# Patient Record
Sex: MALE | Race: ASIAN
[De-identification: names, ages, dates, MRNs, and addresses within clinical notes are randomized per-mention and may not be internally consistent; named-entity substitution may affect disease eponyms.]

---

## 2017-08-08 ENCOUNTER — HOSPITAL ENCOUNTER (EMERGENCY)
Dept: HOSPITAL 19 - COL.ER | Age: 22
Discharge: HOME | End: 2017-08-08
Payer: COMMERCIAL

## 2017-08-08 VITALS — DIASTOLIC BLOOD PRESSURE: 86 MMHG | SYSTOLIC BLOOD PRESSURE: 144 MMHG | TEMPERATURE: 99.3 F

## 2017-08-08 VITALS — HEIGHT: 64.02 IN | BODY MASS INDEX: 34.66 KG/M2 | WEIGHT: 203.05 LBS

## 2017-08-08 VITALS — HEART RATE: 92 BPM

## 2017-08-08 DIAGNOSIS — R45.851: ICD-10-CM

## 2017-08-08 DIAGNOSIS — F32.9: Primary | ICD-10-CM

## 2017-08-08 LAB
ADJUSTED CALCIUM: 8.8 MG/DL (ref 8.4–10.2)
ALBUMIN SERPL-MCNC: 4.5 GM/DL (ref 3.5–5)
ALP SERPL-CCNC: 60 U/L (ref 50–136)
ALT SERPL-CCNC: 144 U/L (ref 21–72)
AMPHET UR-MCNC: NEGATIVE NG/ML
ANION GAP SERPL CALC-SCNC: 11 MMOL/L (ref 7–16)
APAP SERPL-MCNC: < 10 UG/ML (ref 10–30)
BARBITURATES UR SCN-MCNC: NEGATIVE NG/ML
BASOPHILS # BLD: 0 10*3/UL (ref 0–0.2)
BASOPHILS NFR BLD AUTO: 0.4 % (ref 0–2)
BENZODIAZ UR-MCNC: NEGATIVE NG/ML
BILIRUB SERPL-MCNC: 1.1 MG/DL (ref 0–1)
BUN SERPL-MCNC: 16 MG/DL (ref 9–20)
BUPRENORPHINE UR-MCNC: NEGATIVE NG/ML
CALCIUM SERPL-MCNC: 9.2 MG/DL (ref 8.4–10.2)
CHLORIDE SERPL-SCNC: 102 MMOL/L (ref 98–107)
CO2 SERPL-SCNC: 27 MMOL/L (ref 22–30)
CREAT SERPL-SCNC: 1.06 MG/DL (ref 0.66–1.25)
EOSINOPHIL # BLD: 0.1 10*3/UL (ref 0–0.7)
EOSINOPHIL NFR BLD: 1.1 % (ref 0–4)
ERYTHROCYTE [DISTWIDTH] IN BLOOD BY AUTOMATED COUNT: 13 % (ref 11.5–14.5)
GLUCOSE SERPL-MCNC: 104 MG/DL (ref 74–106)
GRANULOCYTES # BLD AUTO: 70.4 % (ref 42.2–75.2)
HCT VFR BLD AUTO: 49.7 % (ref 42–52)
HGB BLD-MCNC: 17.1 G/DL (ref 13.5–18)
LYMPHOCYTES # BLD: 1.7 10*3/UL (ref 1.2–3.4)
LYMPHOCYTES NFR BLD: 20.6 % (ref 20–51)
MCH RBC QN AUTO: 30 PG (ref 27–31)
MCHC RBC AUTO-ENTMCNC: 34 G/DL (ref 33–37)
MCV RBC AUTO: 88 FL (ref 80–100)
METHADONE UR-MCNC: NEGATIVE NG/ML
MONOCYTES # BLD: 0.6 10*3/UL (ref 0.1–0.6)
MONOCYTES NFR BLD AUTO: 7.1 % (ref 1.7–9.3)
NEUTROPHILS # BLD: 5.9 10*3/UL (ref 1.4–6.5)
OPIATES UR-MCNC: NEGATIVE NG/ML
OXYCODONE UR CFM-MCNC: NEGATIVE NG/ML
PCP UR-MCNC: NEGATIVE NG/ML
PLATELET # BLD AUTO: 315 K/MM3 (ref 130–400)
PMV BLD AUTO: 9.2 FL (ref 7.4–10.4)
POTASSIUM SERPL-SCNC: 4.3 MMOL/L (ref 3.4–5)
PROPOXYPH UR-MCNC: NEGATIVE NG/ML
PROT SERPL-MCNC: 8.1 GM/DL (ref 6.4–8.2)
RBC # BLD AUTO: 5.68 M/MM3 (ref 4.2–5.6)
SALICYLATES SERPL-MCNC: < 1 MG/DL
SODIUM SERPL-SCNC: 139 MMOL/L (ref 137–145)
THC CANNABINOIDS URINE: NEGATIVE NG/ML
WBC # BLD AUTO: 8.4 K/MM3 (ref 4.8–10.8)

## 2017-09-05 ENCOUNTER — HOSPITAL ENCOUNTER (OUTPATIENT)
Dept: HOSPITAL 19 - COL.CARD | Age: 22
End: 2017-09-05
Attending: FAMILY MEDICINE
Payer: COMMERCIAL

## 2017-09-05 DIAGNOSIS — R00.0: Primary | ICD-10-CM

## 2019-04-02 ENCOUNTER — HOSPITAL ENCOUNTER (OUTPATIENT)
Dept: HOSPITAL 19 - COL.RAD | Age: 24
End: 2019-04-02
Attending: FAMILY MEDICINE
Payer: COMMERCIAL

## 2019-04-02 DIAGNOSIS — R94.5: Primary | ICD-10-CM

## 2019-05-29 NOTE — NUR
LEFT INSTRUCTIONS ON THE MACHINE. ASKED FOR PT TO CALL BACK AND THE NUMBER WAS
LEFT. WE NEED POMH, MEDS AND ALLERGIES

## 2019-06-03 ENCOUNTER — HOSPITAL ENCOUNTER (OUTPATIENT)
Dept: HOSPITAL 19 - COL.RAD | Age: 24
End: 2019-06-03
Attending: PHYSICIAN ASSISTANT
Payer: COMMERCIAL

## 2019-06-03 VITALS — SYSTOLIC BLOOD PRESSURE: 135 MMHG | DIASTOLIC BLOOD PRESSURE: 99 MMHG | HEART RATE: 63 BPM

## 2019-06-03 VITALS — SYSTOLIC BLOOD PRESSURE: 146 MMHG | DIASTOLIC BLOOD PRESSURE: 90 MMHG | HEART RATE: 74 BPM

## 2019-06-03 VITALS — HEART RATE: 66 BPM | DIASTOLIC BLOOD PRESSURE: 97 MMHG | SYSTOLIC BLOOD PRESSURE: 141 MMHG

## 2019-06-03 VITALS — HEART RATE: 65 BPM | SYSTOLIC BLOOD PRESSURE: 130 MMHG | DIASTOLIC BLOOD PRESSURE: 83 MMHG

## 2019-06-03 VITALS — DIASTOLIC BLOOD PRESSURE: 97 MMHG | SYSTOLIC BLOOD PRESSURE: 143 MMHG | HEART RATE: 67 BPM

## 2019-06-03 VITALS — SYSTOLIC BLOOD PRESSURE: 136 MMHG | DIASTOLIC BLOOD PRESSURE: 91 MMHG | HEART RATE: 91 BPM

## 2019-06-03 VITALS — DIASTOLIC BLOOD PRESSURE: 87 MMHG | SYSTOLIC BLOOD PRESSURE: 140 MMHG | HEART RATE: 61 BPM

## 2019-06-03 VITALS — HEART RATE: 79 BPM | DIASTOLIC BLOOD PRESSURE: 96 MMHG | SYSTOLIC BLOOD PRESSURE: 143 MMHG

## 2019-06-03 VITALS — BODY MASS INDEX: 36.51 KG/M2 | HEIGHT: 64.02 IN | WEIGHT: 213.85 LBS

## 2019-06-03 VITALS — DIASTOLIC BLOOD PRESSURE: 99 MMHG | SYSTOLIC BLOOD PRESSURE: 141 MMHG | HEART RATE: 73 BPM

## 2019-06-03 VITALS — HEART RATE: 75 BPM | SYSTOLIC BLOOD PRESSURE: 145 MMHG | DIASTOLIC BLOOD PRESSURE: 102 MMHG

## 2019-06-03 DIAGNOSIS — R76.8: ICD-10-CM

## 2019-06-03 DIAGNOSIS — R79.0: ICD-10-CM

## 2019-06-03 DIAGNOSIS — R79.89: ICD-10-CM

## 2019-06-03 DIAGNOSIS — K75.81: Primary | ICD-10-CM

## 2019-06-03 LAB
INR BLD: 0.9 (ref 0.8–3)
PROTHROMBIN TIME: 10.7 SECONDS (ref 9.7–12.8)

## 2019-06-03 NOTE — NUR
PT BROUGHT INTO CT ROOM AND PLACED ON THE TABLE. MONITORING EQUIPMENT PLACED.
PT REQUESTED MEDS TO RELAX HIM.

## 2019-06-03 NOTE — NUR
PROCEDURE COMPLETED. MONITORING EQUIPMENT REMOVED. PT DOING WELL.  PT TAKEN TO
RAD HOLDING VIA WHEELCHAIR

## 2021-02-17 ENCOUNTER — HOSPITAL ENCOUNTER (OUTPATIENT)
Dept: HOSPITAL 19 - COL.RAD | Age: 26
End: 2021-02-17
Attending: FAMILY MEDICINE
Payer: COMMERCIAL

## 2021-02-17 DIAGNOSIS — K76.0: Primary | ICD-10-CM
